# Patient Record
Sex: FEMALE | Race: OTHER | Employment: UNEMPLOYED | URBAN - NONMETROPOLITAN AREA
[De-identification: names, ages, dates, MRNs, and addresses within clinical notes are randomized per-mention and may not be internally consistent; named-entity substitution may affect disease eponyms.]

---

## 2024-02-01 ENCOUNTER — OFFICE VISIT (OUTPATIENT)
Dept: URGENT CARE | Facility: CLINIC | Age: 29
End: 2024-02-01
Payer: COMMERCIAL

## 2024-02-01 VITALS
HEART RATE: 60 BPM | DIASTOLIC BLOOD PRESSURE: 60 MMHG | HEIGHT: 60 IN | WEIGHT: 112.2 LBS | BODY MASS INDEX: 22.03 KG/M2 | TEMPERATURE: 97.4 F | SYSTOLIC BLOOD PRESSURE: 102 MMHG | OXYGEN SATURATION: 99 % | RESPIRATION RATE: 16 BRPM

## 2024-02-01 DIAGNOSIS — Z02.4 DRIVER'S PERMIT PHYSICAL EXAMINATION: Primary | ICD-10-CM

## 2024-02-01 NOTE — PATIENT INSTRUCTIONS
Seguridad del conductor adolescente   LO QUE NECESITA SABER:   Las lesiones y muertes de adolescentes causadas por accidentes automovilísticos pueden prevenirse. Esté consciente de las causas principales de los accidentes automovilísticos de adolescentes. Manténgase seguro usando un acuerdo de conducción entre padres e hijos.  [facility]:   Lo que hay que saber sobre la seguridad de los conductores adolescentes: Las lesiones y muertes de adolescentes causadas por accidentes automovilísticos pueden prevenirse. Esté consciente de las causas principales de los accidentes automovilísticos de adolescentes. Use un acuerdo de conducción entre padres e hijos. El acuerdo pasa por acciones de seguridad prometidas por el adolescente. También indica cuáles son las consecuencias si no se siguen las acciones. Pregúntele a wang médico dónde conseguir el acuerdo.  Pautas de seguridad para conductores adolescentes:  Use siempre wang cinturón de seguridad. La manera más fácil de prevenir muertes en accidentes es abrocharse el cinturón de seguridad.    Esté consciente de los posibles problemas. Los problemas pueden incluir otros vehículos, el clima, los peatones y los ciclistas. Asegúrese de practicar en diferentes caminos, a diferentes horas del día. También practicar en todo tipo de clima.    Ponga toda la atención en la conducción. Las distracciones pueden aumentar wang riesgo de un accidente automovilístico. No  hable por teléfono celular ni envíe mensajes de texto mientras conduce. La comida y la radio también pueden ser nick distracción mientras conduce. No coma ni intente ajustar la radio mientras conduce.    Limite la cantidad de pasajeros adolescentes. Wang riesgo de sufrir un accidente aumenta si permite que otros adolescentes viajen en wang automóvil. Siga las restricciones de wang estado para el número de adolescentes en wang auto. Wang estado puede decir 0 a 1 adolescente.    Conducir de noche aumenta el  riesgo de accidentes. Conduzca nichole el día o salga hacia la carretera bastante temprano en la noche.    Esté jane descansado cuando conduzca. No maneje si está cansado o somnoliento.    No conduzca mientras tenga un estado deteriorado. Evelia bebida alcohólica puede causar un deterioro. Las drogas también pueden causar deterioro. No  maneje si está usando drogas.    Obedezca los límites de velocidad. Asegúrese de que wang velocidad coincida con las condiciones de la carretera. Deje suficiente espacio entre usted y el vehículo que tiene sg en nils de que se detenga repentinamente.    © Copyright [employer] 2023 Information is for End User's use only and may not be sold, redistributed or otherwise used for commercial purposes.  Esta información es sólo para uso en educación. Wang intención no es darle un consejo médico sobre enfermedades o tratamientos. Colsulte con wang médico, enfermera o farmacéutico antes de seguir cualquier régimen médico para saber si es seguro y efectivo para usted.

## 2024-02-01 NOTE — PROGRESS NOTES
[facility]        NAME: Aidee Youssef is a 29 y.o. female  : 1995    MRN: [id number]  DATE: 2024  TIME: 3:11 PM    Assessment and Plan   's permit physical examination [Z02.4]  1. 's permit physical examination              Patient Instructions     Patient Instructions   Seguridad del conductor adolescente   LO QUE NECESITA SABER:   Las lesiones y muertes de adolescentes causadas por accidentes automovilísticos pueden prevenirse. Esté consciente de las causas principales de los accidentes automovilísticos de adolescentes. Manténgase seguro usando un acuerdo de conducción entre padres e hijos.  [facility]:   Lo que hay que saber sobre la seguridad de los conductores adolescentes: Las lesiones y muertes de adolescentes causadas por accidentes automovilísticos pueden prevenirse. Esté consciente de las causas principales de los accidentes automovilísticos de adolescentes. Use un acuerdo de conducción entre padres e hijos. El acuerdo pasa por acciones de seguridad prometidas por el adolescente. También indica cuáles son las consecuencias si no se siguen las acciones. Pregúntele a wang médico dónde conseguir el acuerdo.  Pautas de seguridad para conductores adolescentes:  Use siempre wang cinturón de seguridad. La manera más fácil de prevenir muertes en accidentes es abrocharse el cinturón de seguridad.    Esté consciente de los posibles problemas. Los problemas pueden incluir otros vehículos, el clima, los peatones y los ciclistas. Asegúrese de practicar en diferentes caminos, a diferentes horas del día. También practicar en todo tipo de clima.    Ponga toda la atención en la conducción. Las distracciones pueden aumentar wang riesgo de un accidente automovilístico. No  hable por teléfono celular ni envíe mensajes de texto mientras conduce. La comida y la radio también pueden ser nick distracción mientras conduce. No coma ni intente ajustar la radio  mientras conduce.    Limite la cantidad de pasajeros adolescentes. Wang riesgo de sufrir un accidente aumenta si permite que otros adolescentes viajen en wang automóvil. Siga las restricciones de wang estado para el número de adolescentes en wang auto. Wang estado puede decir 0 a 1 adolescente.    Conducir de noche aumenta el riesgo de accidentes. Conduzca nichole el día o salga hacia la carretera bastante temprano en la noche.    Esté jane descansado cuando conduzca. No maneje si está cansado o somnoliento.    No conduzca mientras tenga un estado deteriorado. Evelia bebida alcohólica puede causar un deterioro. Las drogas también pueden causar deterioro. No  maneje si está usando drogas.    Obedezca los límites de velocidad. Asegúrese de que wang velocidad coincida con las condiciones de la carretera. Deje suficiente espacio entre usted y el vehículo que tiene sg en nils de que se detenga repentinamente.    © Copyright [employer] 2023 Information is for End User's use only and may not be sold, redistributed or otherwise used for commercial purposes.  Esta información es sólo para uso en educación. Wang intención no es darle un consejo médico sobre enfermedades o tratamientos. Colsulte con wang médico, enfermera o farmacéutico antes de seguir cualquier régimen médico para saber si es seguro y efectivo para usted.        Follow up with PCP in 3-5 days.  Proceed to  ER if symptoms worsen.    Chief Complaint     Chief Complaint   Patient presents with    Annual Exam     's Learner's Permit         History of Present Illness       The patient presents the clinic for her 's physical.  She denies history of seizure disorder, coronary artery disease, hypertension, diabetes, drug abuse, alcohol abuse, neurological disorder, or psychiatric disorders that would interfere with driving        Review of Systems   Review of Systems   Constitutional:  Negative for chills and fever.   HENT:  Negative for ear pain and sore throat.     Eyes:  Negative for pain and visual disturbance.   Respiratory:  Negative for cough and shortness of breath.    Cardiovascular:  Negative for chest pain and palpitations.   Gastrointestinal:  Negative for abdominal pain and vomiting.   Genitourinary:  Negative for dysuria and hematuria.   Musculoskeletal:  Negative for arthralgias and back pain.   Skin:  Negative for color change and rash.   Neurological:  Negative for seizures and syncope.   All other systems reviewed and are negative.        Current Medications     No current outpatient medications on file.    Current Allergies     Allergies as of 02/01/2024    (No Known Allergies)            The following portions of the patient's history were reviewed and updated as appropriate: allergies, current medications, past family history, past medical history, past social history, past surgical history and problem list.     History reviewed. No pertinent past medical history.    History reviewed. No pertinent surgical history.    History reviewed. No pertinent family history.      Medications have been verified.        Objective   /60   Pulse 60   Temp (!) 97.4 °F (36.3 °C)   Resp 16   Ht 5' (1.524 m)   Wt 50.9 kg (112 lb 3.2 oz)   SpO2 99%   BMI 21.91 kg/m²        Physical Exam     Physical Exam  Constitutional:       Appearance: She is well-developed. She is not diaphoretic.   HENT:      Head: Normocephalic.   Eyes:      General:         Right eye: No discharge.         Left eye: No discharge.      Pupils: Pupils are equal, round, and reactive to light.   Neck:      Thyroid: No thyromegaly.   Cardiovascular:      Rate and Rhythm: Normal rate.      Heart sounds: No murmur heard.  Pulmonary:      Effort: Pulmonary effort is normal. No respiratory distress.      Breath sounds: No wheezing or rales.   Chest:      Chest wall: No tenderness.   Abdominal:      General: There is no distension.      Palpations: Abdomen is soft.      Tenderness: There is no  abdominal tenderness. There is no guarding or rebound.   Musculoskeletal:         General: Normal range of motion.      Cervical back: Normal range of motion.   Lymphadenopathy:      Cervical: No cervical adenopathy.   Skin:     General: Skin is warm.   Neurological:      Mental Status: She is alert and oriented to person, place, and time.             She has no contraindications to driving a motor vehicle.